# Patient Record
Sex: MALE | Race: WHITE | NOT HISPANIC OR LATINO | URBAN - METROPOLITAN AREA
[De-identification: names, ages, dates, MRNs, and addresses within clinical notes are randomized per-mention and may not be internally consistent; named-entity substitution may affect disease eponyms.]

---

## 2018-03-27 ENCOUNTER — OFFICE VISIT CONVERTED (OUTPATIENT)
Dept: OTHER | Facility: HOSPITAL | Age: 38
End: 2018-03-27
Attending: INTERNAL MEDICINE

## 2018-04-10 ENCOUNTER — OFFICE VISIT CONVERTED (OUTPATIENT)
Dept: OTHER | Facility: HOSPITAL | Age: 38
End: 2018-04-10
Attending: INTERNAL MEDICINE

## 2018-06-05 ENCOUNTER — OFFICE VISIT CONVERTED (OUTPATIENT)
Dept: OTHER | Facility: HOSPITAL | Age: 38
End: 2018-06-05
Attending: INTERNAL MEDICINE

## 2018-12-04 ENCOUNTER — OFFICE VISIT CONVERTED (OUTPATIENT)
Dept: OTHER | Facility: HOSPITAL | Age: 38
End: 2018-12-04
Attending: INTERNAL MEDICINE

## 2019-06-10 ENCOUNTER — HOSPITAL ENCOUNTER (OUTPATIENT)
Dept: OTHER | Facility: HOSPITAL | Age: 39
Discharge: HOME | End: 2019-06-10
Attending: INTERNAL MEDICINE

## 2019-06-10 ENCOUNTER — OFFICE VISIT CONVERTED (OUTPATIENT)
Dept: OTHER | Facility: HOSPITAL | Age: 39
End: 2019-06-10
Attending: INTERNAL MEDICINE

## 2019-06-10 LAB
BASOPHILS # BLD AUTO: 0.07 10*3/UL (ref 0–0.2)
BASOPHILS NFR BLD AUTO: 1 % (ref 0–3)
CONV ABS IMM GRAN: 0.09 10*3/UL (ref 0–0.2)
CONV IMMATURE GRAN: 1.3 % (ref 0–1.8)
DEPRECATED RDW RBC AUTO: 41.1 FL (ref 35.1–43.9)
EOSINOPHIL # BLD AUTO: 0.32 10*3/UL (ref 0–0.7)
EOSINOPHIL # BLD AUTO: 4.6 % (ref 0–7)
ERYTHROCYTE [DISTWIDTH] IN BLOOD BY AUTOMATED COUNT: 12.6 % (ref 11.6–14.4)
FERRITIN SERPL-MCNC: 425 NG/ML (ref 30–300)
HBA1C MFR BLD: 15.2 G/DL (ref 14–18)
HCT VFR BLD AUTO: 46.2 % (ref 42–52)
IRON SATN MFR SERPL: 21 % (ref 20–55)
IRON SERPL-MCNC: 81 UG/DL (ref 70–180)
LYMPHOCYTES # BLD AUTO: 1.93 10*3/UL (ref 1–5)
MCH RBC QN AUTO: 29.4 PG (ref 27–31)
MCHC RBC AUTO-ENTMCNC: 32.9 G/DL (ref 33–37)
MCV RBC AUTO: 89.4 FL (ref 80–96)
MONOCYTES # BLD AUTO: 0.47 10*3/UL (ref 0.2–1.2)
MONOCYTES NFR BLD AUTO: 6.8 % (ref 3–10)
NEUTROPHILS # BLD AUTO: 4.05 10*3/UL (ref 2–8)
NEUTROPHILS NFR BLD AUTO: 58.5 % (ref 30–85)
NRBC CBCN: 0 % (ref 0–0.7)
PLATELET # BLD AUTO: 374 10*3/UL (ref 130–400)
PMV BLD AUTO: 10.4 FL (ref 9.4–12.4)
RBC # BLD AUTO: 5.17 10*6/UL (ref 4.7–6.1)
RETICS # AUTO: 0.09 10*6/UL (ref 0.03–0.1)
RETICS/RBC NFR AUTO: 1.82 % (ref 0.51–1.81)
TIBC SERPL-MCNC: 379 UG/DL (ref 245–450)
TRANSFERRIN SERPL-MCNC: 265 MG/DL (ref 215–365)
VARIANT LYMPHS NFR BLD MANUAL: 27.8 % (ref 20–45)
WBC # BLD AUTO: 6.93 10*3/UL (ref 4.8–10.8)

## 2021-05-28 VITALS
BODY MASS INDEX: 32.57 KG/M2 | WEIGHT: 245.12 LBS | BODY MASS INDEX: 34.46 KG/M2 | OXYGEN SATURATION: 95 % | RESPIRATION RATE: 16 BRPM | SYSTOLIC BLOOD PRESSURE: 127 MMHG | RESPIRATION RATE: 21 BRPM | TEMPERATURE: 98 F | HEART RATE: 75 BPM | HEIGHT: 72 IN | DIASTOLIC BLOOD PRESSURE: 80 MMHG | WEIGHT: 242.25 LBS | HEIGHT: 72 IN | HEART RATE: 68 BPM | DIASTOLIC BLOOD PRESSURE: 73 MMHG | BODY MASS INDEX: 32.81 KG/M2 | OXYGEN SATURATION: 93 % | OXYGEN SATURATION: 94 % | DIASTOLIC BLOOD PRESSURE: 69 MMHG | RESPIRATION RATE: 16 BRPM | BODY MASS INDEX: 32.98 KG/M2 | OXYGEN SATURATION: 95 % | DIASTOLIC BLOOD PRESSURE: 77 MMHG | RESPIRATION RATE: 16 BRPM | TEMPERATURE: 98.4 F | RESPIRATION RATE: 18 BRPM | WEIGHT: 240.5 LBS | HEART RATE: 79 BPM | SYSTOLIC BLOOD PRESSURE: 136 MMHG | SYSTOLIC BLOOD PRESSURE: 125 MMHG | SYSTOLIC BLOOD PRESSURE: 130 MMHG | OXYGEN SATURATION: 97 % | WEIGHT: 254.4 LBS | SYSTOLIC BLOOD PRESSURE: 133 MMHG | TEMPERATURE: 98.3 F | TEMPERATURE: 98.1 F | HEIGHT: 72 IN | HEIGHT: 72 IN | HEIGHT: 72 IN | DIASTOLIC BLOOD PRESSURE: 76 MMHG | HEART RATE: 82 BPM | BODY MASS INDEX: 33.2 KG/M2 | TEMPERATURE: 98.3 F | HEART RATE: 85 BPM | WEIGHT: 243.5 LBS
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
## 2021-05-28 NOTE — PROGRESS NOTES
Patient: CIRO NASH     Acct: [id number]     Report: #[id number]  UNIT #: [id number]     : 1980    Encounter Date:2018  PRIMARY CARE:   ***Signed***  --------------------------------------------------------------------------------------------------------------------  Progress Note      DATE: 18      Primary Care Provider:  AGNES CHAN      Referring Provider:  AGNES CHAN      Chief Complaint      Thrombocytosis, SHANNON Follow-up            Subjective      37-year-old white male was referred for thrombocytosis which on workup for     secondary to iron deficiency anemia.  Patient has chronic hematuria probably     from nephrolithiasis and this is deemed to be this etiology of his SHANNON.      Patient had been started on iron and vitamin C and claimed that he had much     more energy that he can play with his daughter.            Past Med/Surg History            Past Med/Surg History:   No Hypertension             No Diabetes Mellitus             No Heart Disease             No Blood Clots             No Cancer             No Lung Disease             No Kidney Disease (stones)             Other (nephrolithiasis status post lithotripsy)            Social History      Social History:  No Tobacco Use, Alcohol Use ( a beer a day), No Recreational     Drug use, No Other            Allergies      Coded Allergies:             *No Known Allergies (Verified  Allergy, Mild, 3/27/18)            Medications      Medications    Last Reconciled on 18 18:43 by PASTOR ACHARYA MD      Ascorbic Acid (Vitamin C*) 500 Mg Tablet      500 MG PO BID for 90 Days, #180 TAB 3 Refills         Prov: Bertram Acharyaazon         4/10/18       Ferrous Sulfate (Ferrous Sulfate*) 325 Mg Tablet      325 MG PO TID for 90 Days, #270 TAB 3 Refills         Prov: Ve[place]         4/10/18      Current Medications      Current Medications Reviewed 18            Pain Assessment      Pain Intensity:  0      Description:  None             Review of Systems      General:  No Fatigue Scale:, No Pain Scale:      HEENT:  No Dysphagia, No Hearing Changes, No Visual Changes      Respiratory:  No Cough, No Shortness of Air      Cardiovascular:  No Chest Pain, No Palpitations      Gastrointestinal:  No Nausea, No Vomiting, No Constipation, No Diarrhea,     Appetite Good      Genitourinary:  No Nocturia, No Dysuria      Musculoskeletal:  No Aches, No Pains      Endocrine:  No Heat Intolerance, No Fatigue      Hematologic:  No Bleeding, No Bruising      Allergic/Immunologic:  No Hives, No Nasal drip      Psychological:  No Anxiety, No Depression      Neurological:  No Headaches, No Dizziness      Skin:  No Rash, No Edema            Vitals      Height 5 ft 11.5 in / 181.61 cm      Weight 242 lbs 4 oz / 109.405862 kg      BSA 2.39 m2      BMI 33.3 kg/m2      Temperature 98.4 F / 36.89 C      Pulse 68      Respirations 18      Blood Pressure 130/76      Pulse Oximetry 95%            Exam      Constitutional:  No acute distress, Conversant, Pleasant      Eyes:  Anicteric sclerae, Palpebral Conjunctivae (pink), IMTIAZ      HENT:  Oropharynx clear, No Erythema, Buccal mucosae (pink)      Neck:  Supple, Full Range of Motion      Cardiovascular:  RRR, No Murmurs, Normal PMI, No Peripheral Edema      Lungs:  Clear to Ausculation, Normal Respiratory Effort      Abdomen:  Soft, NABS, No Masses, No Tenderness      Chest:  Other (symmetrical and equal)      Lymphatic:  No Neck      Extremities:  No digital cyanosis, Normal gait      Neurological:  Cranial Nerve II-XII, No Focal Sensory deficits      Psychological:  Appropriate affect, Appropriate mood, Intact judgement, Alert      Skin:  Normal temperature, Other (no dermatosis)            Lab Results      Laboratory Tests      3/27/18 15:20            6/5/18 16:56            Laboratory Tests            Test        2/15/18      14:36 2/15/18      16:50 3/27/18      15:20 3/29/18      21:57             Troponin T         <0.01 ng/mL      (0.00-0.10)                      Lipase 23 U/L (5-51)                 Urine Collection Type  Unknown NA                Urine Color  YELLOW NA                Urine Appearance  CLEAR NA                Urine pH                6.0 (pH)      (5.0-8.0)                      Urine Specific Gravity                1.026 NA      (1.005-1.030)                      Urine Protein                NEGATIVE mg/dL      (NEGATIVE)                      Urine Glucose (UA)                NEGATIVE mg/dL      (NEGATIVE)                      Urine Ketones                NEGATIVE mg/dL      (NEGATIVE)                      Urine Occult Blood                TRACE NA      (NEGATIVE)                      Urine Nitrite                NEGATIVE NA      (NEGATIVE)                      Urine Bilirubin                NEGATIVE NA      (NEGATIVE)                      Urine Urobilinogen                0.2      {EhrlichU}/dL                      Urine Leukocyte Esterase                NEGATIVE NA      (NEGATIVE)                      Urine RBC                SMALL(6-10)      /(HPF)                      Urine WBC                NONE SEEN      /(HPF)                      Urine Bacteria  NEGATIVE NA                Urine Hyaline Casts  6-10 NA                Absolute Reticulocyte Count   93.10 10*3/uL               Percent Reticulocyte Count   1.76 %               Sodium Level                145 mmol/L      (135-147)              Potassium Level                3.7 mmol/L      (3.5-5.3)              Chloride Level                104 mmol/L      ()              Carbon Dioxide Level                28 mmol/L      (22-32)              Anion Gap                17 mmol/L      (8-19)              Blood Urea Nitrogen                13 mg/dL      (5-25)              Creatinine                0.89 mg/dL      (0.70-1.20)              Glomerular Filtration Rate      Calc                 >60      mL/min/{1.73_m2}              BUN/Creatinine Ratio                 15 {ratio}      (6-20)              Glucose Level                89 mg/dL      (70-99)              Serum Osmolality   300 (273-304)               Calcium Level                9.8 mg/dL      (8.7-10.4)              Total Bilirubin                0.77 mg/dL      (0.20-1.30)              Aspartate Amino Transf      (AST/SGOT)                 24 U/L (15-50)                      Alanine Aminotransferase      (ALT/SGPT)                 45 U/L (10-40)                      Alkaline Phosphatase                83 U/L      ()              Total Protein                7.3 g/dL      (6.3-8.2)              Albumin                4.9 g/dL      (3.5-5.0)              Globulin                2.4 g/dL      (2.0-3.5)              Albumin/Globulin Ratio                2.0 {ratio}      (1.4-2.6)              Anti-Nuclear Antibody Ref Lab                Negative NA      (Negative)              Anti-Nuclear Antibody Comment   Comment NA               Lab Scanned Report                LAB FINAL      CUMULATIVES -             Test        6/5/18      16:56                      White Blood Count        7.38 10*3/uL      (4.80-10.80)                      Red Blood Count        5.29 10*6/uL      (4.70-6.10)                      Hemoglobin        16.00 g/dL      (14.00-18.00)                      Hematocrit        46.4 %      (42.0-52.0)                      Mean Corpuscular Volume        87.7 fL      (80.0-96.0)                      Mean Corpuscular Hemoglobin        30.3 pg      (27.0-31.0)                      Mean Corpuscular Hemoglobin      Concent 34.6 %      (33.0-37.0)                      Red Cell Distribution Width        11.8 %      (11.5-14.5)                      Platelet Count        357.00 10*3/uL      (130..00)                      Mean Platelet Volume        7.2 fL      (7.4-10.4)                      Granulocytes (%)        47.1 %      (30.0-85.0)                      Lymphocytes (%) (Auto)        36.70 %       (20.00-45.00)                      Monocytes (%) (Auto)        7.49 %      (3.00-10.00)                      Eosinophils (%) (Auto)        7.98 %      (0.00-7.00)                      Basophils (%) (Auto)        0.77 %      (0.00-3.00)                      Granulocytes #        3.48 10*3/uL      (2.00-8.00)                      Lymphocytes # (Auto)        2.71 10*3/uL      (1.00-5.00)                      Monocytes # (Auto)        0.55 10*3/uL      (0.20-1.20)                      Eosinophils # (Auto)        0.59 10*3/uL      (0.00-0.70)                      Basophils # (Auto)        0.06 10*3/uL      (0.00-0.20)                      Nucleated Red Blood Cells %        0.00 %      (0.00-0.01)                      Erythrocyte Sedimentation Rate 1 mm/h (0-20)                 Iron Level        106 ug/dL      ()                      Total Iron Binding Capacity        360 ug/dL      (245-450)                      Percent Iron Saturation 29 % (20-55)                 Transferrin        252.00 mg/dL      (215..00)                      Ferritin        419 ng/mL      ()                            Impression/Problem List      Thrombocytosis - last platelet count is normaL      Low iron saturation resolved with iron replacement      Chronic hematuria      History of nephrolithiasis      Notes      New Diagnostics      * CBC, Stat       Dx: SHANNON (iron deficiency anemia) - D50.9      * Iron Profile, Stat       Dx: SHANNON (iron deficiency anemia) - D50.9      * Reticulocyte Count, Stat       Dx: SHANNON (iron deficiency anemia) - D50.9      * Ferritin Level, Stat       Dx: SHANNON (iron deficiency anemia) - D50.9            Plan      Ferrous sulfate 3 times a day      Vitamin C 500 mg twice a day      6 months repeat CBC, iron profile, ferritin level and reticulocyte count            Patient Education:        Iron-Deficiency Anemia            PREVENTION      Hx Influenza Vaccination:  No      Influenza Vaccine Declined:   No      2 or More Falls Past Year?:  No      Fall Past Year with Injury?:  No      Hx Pneumococcal Vaccination:  Yes      Encouraged to follow-up with:  PCP regarding preventative exams.      Chart initiated by      BABAK Herrera RN                 Disclaimer: Converted document may not contain table formatting or lab diagrams. Please see [employer] for the authenticated document.

## 2021-05-28 NOTE — PROGRESS NOTES
Patient: CIRO NASH     Acct: [id number]     Report: #[id number]  UNIT #: [id number]     : 1980    Encounter Date:04/10/2018  PRIMARY CARE:   ***Signed***  --------------------------------------------------------------------------------------------------------------------  Progress Note      DATE: 4/10/18      Primary Care Provider:  AGENS CHAN      Referring Provider:  AGNES CHAN      Chief Complaint      Follow up Thrombocytosis            Subjective      37-year-old white male has been referred for to macrocytosis.  Patient is here     for results of his workup.            Past Med/Surg History            Past Med/Surg History:   No Hypertension             No Diabetes Mellitus             No Heart Disease             No Blood Clots             No Cancer             No Lung Disease             No Kidney Disease (stones)             Other (nephrolithiasis status post lithotripsy)            Social History      Social History:  No Tobacco Use, Alcohol Use ( a beer a day), No Recreational     Drug use, No Other            Allergies      Coded Allergies:             *No Known Allergies (Verified  Allergy, Mild, 3/27/18)            Medications      Current Medications      Current Medications Reviewed 4/10/18            Pain Assessment      Pain Intensity:  2 (back and stomach)      Description:  Aching, None      Duration:  Intermittent            Review of Systems      General:  No Anxiety, Fatigue Scale: (5), Pain Scale: (2), No Fever, No Other      HEENT:  No Dysphagia, No Hearing Changes, No Rhinorrhea, No Tinnitus, No Visual     Changes, No Nasal Congestion, No Epistaxis, No Other      Respiratory:  No Cough, Shortness of Air, No Sputum Changes, No Wheezing, No     Hemoptysis, No Congestion, No Other      Cardiovascular:  No Chest Pain, No Pedal Edema, No Orthopnea, No Palpitations,     No Chest Pressure, No Dizziness, No Other      Gastrointestinal:  No Nausea, No Vomiting, No Dysphagia,  No Constipation, No     Diarrhea, No Appetite Good, Appetite Fair, No Appetite Poor, No Early Satiety,     No Other      Genitourinary:  No Nocturia, No Dysuria, No Other      Musculoskeletal:  No Joint Effusions, Joint Tenderness, Joint Stiffness, No     Myalgias, Aches, Pains, No Other      Endocrine:  No Heat Intolerance, No Cold Intolerance, No Fatigue, No Blood     Sugar Control, No Other      Hematologic:  No Bleeding, No Bruising, No Swollen Glands, No Other      Allergic/Immunologic:  No Hives, No Throat closing off, No Nasal drip, No Itchy     eyes, No Hay fever, No Other      Psychological:  No Anxiety, No Depression, No Other      Neurological:  No Headaches, No Dizziness, Weakness, No Numbness, No Other      Skin:  No Rash, No Open Wounds, No Edema, No Other            Vitals      Height 5 ft 11.5 in / 181.61 cm      Weight 243 lbs 8 oz / 110.281115 kg      BSA 2.40 m2      BMI 33.5 kg/m2      Temperature 98.1 F / 36.72 C - Oral      Pulse 79      Respirations 16      Blood Pressure 127/77 Sitting, Left Arm      Pulse Oximetry 97%, room air            Exam      Constitutional:  No acute distress, Conversant, Pleasant      Eyes:  Anicteric sclerae, Palpebral Conjunctivae (pink), IMTIAZ      Cardiovascular:  RRR, No Murmurs, Normal PMI, No Peripheral Edema      Lungs:  Clear to Ausculation, Normal Respiratory Effort      Chest:  Other (symmetrical and equal)      Extremities:  Normal gait      Neurological:  Cranial Nerve II-XII (Intact), No Focal Sensory deficits      Psychological:  Appropriate affect, Intact judgement, Alert      Skin:  Other            Lab Results      Laboratory Tests      3/27/18 15:20            Laboratory Tests            Test        2/15/18      14:36 2/15/18      16:50 3/27/18      15:20 3/29/18      21:57             Troponin T        <0.01 ng/mL      (0.00-0.10)                      Lipase 23 U/L (5-51)                 Urine Collection Type  Unknown NA                Urine Color   YELLOW NA                Urine Appearance  CLEAR NA                Urine pH                6.0 (pH)      (5.0-8.0)                      Urine Specific Gravity                1.026 NA      (1.005-1.030)                      Urine Protein                NEGATIVE mg/dL      (NEGATIVE)                      Urine Glucose (UA)                NEGATIVE mg/dL      (NEGATIVE)                      Urine Ketones                NEGATIVE mg/dL      (NEGATIVE)                      Urine Occult Blood                TRACE NA      (NEGATIVE)                      Urine Nitrite                NEGATIVE NA      (NEGATIVE)                      Urine Bilirubin                NEGATIVE NA      (NEGATIVE)                      Urine Urobilinogen                0.2      {EhrlichU}/dL                      Urine Leukocyte Esterase                NEGATIVE NA      (NEGATIVE)                      Urine RBC                SMALL(6-10)      /(HPF)                      Urine WBC                NONE SEEN      /(HPF)                      Urine Bacteria  NEGATIVE NA                Urine Hyaline Casts  6-10 NA                White Blood Count                7.21 10*3/uL      (4.80-10.80)              Red Blood Count                5.31 10*6/uL      (4.70-6.10)              Hemoglobin                15.60 g/dL      (14.00-18.00)              Hematocrit                46.8 %      (42.0-52.0)              Mean Corpuscular Volume                88.2 fL      (80.0-96.0)              Mean Corpuscular Hemoglobin                29.4 pg      (27.0-31.0)              Mean Corpuscular Hemoglobin      Concent                 33.3 %      (33.0-37.0)              Red Cell Distribution Width                11.9 %      (11.5-14.5)              Platelet Count                341.00 10*3/uL      (130..00)              Mean Platelet Volume                7.6 fL      (7.4-10.4)              Granulocytes (%)                49.4 %      (30.0-85.0)              Lymphocytes  (%) (Auto)                33.60 %      (20.00-45.00)              Monocytes (%) (Auto)                7.32 %      (3.00-10.00)              Eosinophils (%) (Auto)                8.58 %      (0.00-7.00)              Basophils (%) (Auto)                1.11 %      (0.00-3.00)              Granulocytes #                3.57 10*3/uL      (2.00-8.00)              Lymphocytes # (Auto)                2.42 10*3/uL      (1.00-5.00)              Monocytes # (Auto)                0.53 10*3/uL      (0.20-1.20)              Eosinophils # (Auto)                0.62 10*3/uL      (0.00-0.70)              Basophils # (Auto)                0.08 10*3/uL      (0.00-0.20)              Nucleated Red Blood Cells %                0.00 %      (0.00-0.01)              Erythrocyte Sedimentation Rate   2 mm/h (0-20)               Absolute Reticulocyte Count   93.10 10*3/uL               Percent Reticulocyte Count   1.76 %               Sodium Level                145 mmol/L      (135-147)              Potassium Level                3.7 mmol/L      (3.5-5.3)              Chloride Level                104 mmol/L      ()              Carbon Dioxide Level                28 mmol/L      (22-32)              Anion Gap                17 mmol/L      (8-19)              Blood Urea Nitrogen                13 mg/dL      (5-25)              Creatinine                0.89 mg/dL      (0.70-1.20)              Glomerular Filtration Rate      Calc                 >60      mL/min/{1.73_m2}              BUN/Creatinine Ratio                15 {ratio}      (6-20)              Glucose Level                89 mg/dL      (70-99)              Serum Osmolality   300 (273-304)               Calcium Level                9.8 mg/dL      (8.7-10.4)              Iron Level                68 ug/dL      ()              Total Iron Binding Capacity                408 ug/dL      (245-450)              Percent Iron Saturation   17 % (20-55)               Transferrin                 285.00 mg/dL      (215..00)              Ferritin                375 ng/mL      ()              Total Bilirubin                0.77 mg/dL      (0.20-1.30)              Aspartate Amino Transf      (AST/SGOT)                 24 U/L (15-50)                      Alanine Aminotransferase      (ALT/SGPT)                 45 U/L (10-40)                      Alkaline Phosphatase                83 U/L      ()              Total Protein                7.3 g/dL      (6.3-8.2)              Albumin                4.9 g/dL      (3.5-5.0)              Globulin                2.4 g/dL      (2.0-3.5)              Albumin/Globulin Ratio                2.0 {ratio}      (1.4-2.6)              Anti-Nuclear Antibody Ref Lab                Negative NA      (Negative)              Anti-Nuclear Antibody Comment   Comment NA               Lab Scanned Report                LAB FINAL      CUMULATIVES -            Impression/Problem List      Thrombocytosis - last platelet count is normaL      Low iron saturation      Chronic hematuria      History of nephrolithiasis      Notes      New Medications      * FERROUS SULFATE (Ferrous Sulfate*) 325 MG TABLET: 325 MG PO TID 90 Days #270      * ASCORBIC ACID (Vitamin C*) 500 MG TABLET: 500 MG PO BID 90 Days #180            Plan      Ferrous sulfate 3 times a day      Vitamin C 500 mg twice a day      8 weeks repeat CBC, iron profile, ferritin level and reticulocyte count            Patient Education:        Platelet Count            PREVENTION      Hx Influenza Vaccination:  No      2 or More Falls Past Year?:  No      Fall Past Year with Injury?:  No      Hx Pneumococcal Vaccination:  Yes      Encouraged to follow-up with:  PCP regarding preventative exams.      Chart initiated by      Jacque Anton MA                 Disclaimer: Converted document may not contain table formatting or lab diagrams. Please see [employer] for the authenticated  document.

## 2021-05-28 NOTE — PROGRESS NOTES
Patient: CIRO NASH     Acct: [id number]     Report: #[id number]  UNIT #: [id number]     : 1980    Encounter Date:06/10/2019  PRIMARY CARE:   ***Signed***  --------------------------------------------------------------------------------------------------------------------  Progress Note      DATE: 6/10/19      Primary Care Provider:  AGNES CHAN      Referring Provider:  AGNES CHAN      Chief Complaint      f/u anemia/thrombocytosis            Subjective      38-year-old white male was referred initially because of iron deficiency anemia     and possible thrombocytosis.  With iron replacement patient's normocytosis had     resolved.  Last December he CBC was normal and his iron profile was normal and     the patient's iron was discontinued.            Singh is here to see if his CBC and iron profile stayed normal of iron     replacement.            Past Med/Surg History            Past Med/Surg History:   No Hypertension             No Diabetes Mellitus             No Heart Disease             No Blood Clots             No Cancer             No Lung Disease             No Kidney Disease (stones)             Other (nephrolithiasis status post lithotripsy)            Social History      Social History:  No Tobacco Use; Alcohol Use ( a beer a day); No Recreational     Drug use, No Other            Allergies      Coded Allergies:             *No Known Allergies (Verified  Allergy, Mild, 6/10/19)            Medications      Medications    Last Reconciled on 18 18:43 by PASTOR ROLDAN MD      No Medication Information Entered            Current Medications      Current Medications Reviewed 6/10/19            Pain Assessment      Pain Intensity:  0      Description:  None            Review of Systems      General:  No Anxiety; Fatigue Scale: (9), Pain Scale: (0); No Fever, No Other      HEENT:  No Dysphagia, No Hearing Changes, No Rhinorrhea, No Tinnitus, No Visual     Changes, No Nasal Congestion, No  Epistaxis, No Other      Respiratory:  No Cough, No Shortness of Air, No Sputum Changes, No Wheezing, No     Hemoptysis, No Congestion, No Other      Cardiovascular:  No Chest Pain, No Pedal Edema, No Orthopnea, No Palpitations,     No Chest Pressure, No Dizziness, No Other      Gastrointestinal:  No Nausea, No Vomiting, No Dysphagia, No Constipation, No     Diarrhea, No Appetite Good, No Appetite Fair, No Appetite Poor, No Early     Satiety, No Other      Genitourinary:  No Nocturia, No Dysuria, No Other      Musculoskeletal:  No Joint Effusions, No Joint Tenderness, No Joint Stiffness,     No Myalgias, No Aches, No Pains, No Other      Endocrine:  No Heat Intolerance, No Cold Intolerance, No Fatigue, No Blood Sugar    Control, No Other      Hematologic:  No Bleeding, No Bruising, No Swollen Glands, No Other      Allergic/Immunologic:  No Hives, No Throat closing off, No Nasal drip, No Itchy     eyes, No Hay fever, No Other      Psychological:  No Anxiety, No Depression, No Other      Neurological:  No Headaches, No Dizziness, No Weakness, No Numbness, No Other      Skin:  No Rash, No Open Wounds, No Edema, No Other            Vitals      Height 6 ft 0 in / 182.88 cm      Weight 245 lbs 2 oz / 111.614148 kg      BSA 2.32 m2      BMI 33.2 kg/m2      Temperature 98.0 F / 36.67 C - Oral      Pulse 75      Respirations 16      Blood Pressure 136/80 Sitting, Right Arm      Pulse Oximetry 95%, room air            Exam      Constitutional:  No acute distress, Conversant, Pleasant      Eyes:  Anicteric sclerae, Palpebral Conjunctivae (Pink), IMTIAZ      HENT:  Oropharynx clear; No Erythema; Buccal mucosae (Pink)      Neck:  Supple, Full Range of Motion      Cardiovascular:  RRR; No Murmurs; Normal PMI; No Peripheral Edema      Lungs:  Clear to Ausculation, Normal Respiratory Effort      Abdomen:  Soft, NABS; No Tenderness      Chest:  Other (Symmetrical)      Extremities:  No digital cyanosis, No digital ischemia, Normal  gait, Other (No     deformity)      Neurological:  Cranial Nerve II-XII (Intact); No Focal Sensory deficits      Psychological:  Appropriate affect, Appropriate mood, Intact judgement, Alert      Skin:  Other (No dermatoses)            Lab Results      CBC iron profile drawn today            Impression/Problem List      Thrombocytosis -resolved      Iron deficiency resolved      History of nephrolithiasis      Notes      Discontinued Medications      * FERROUS SULFATE (Ferrous Sulfate*) 325 MG TABLET: 325 MG PO TID 90 Days #270      * ASCORBIC ACID (Vitamin C*) 500 MG TABLET: 500 MG PO BID 90 Days #180      New Diagnostics      * CBC With Auto Diff, Stat         Dx: Thrombocythemia - D47.3      * Iron Profile, Stat         Dx: Thrombocythemia - D47.3      * Reticulocyte Count, Stat         Dx: Thrombocythemia - D47.3      * Ferritin Level, Stat         Dx: Thrombocythemia - D47.3            Plan      CBC, iron profile, ferritin  done today.      Patient will be called back if his studies are still abnormal.      Otherwise patient is discharged from the clinic to be followed as needed.            PREVENTION      Hx Influenza Vaccination:  Yes      Influenza Vaccine Declined:  No      2 or More Falls Past Year?:  No      Fall Past Year with Injury?:  No      Hx Pneumococcal Vaccination:  Yes      Encouraged to follow-up with:  PCP regarding preventative exams.      Chart initiated by      cuauhtemoc herzog ma                 Disclaimer: Converted document may not contain table formatting or lab diagrams. Please see [employer] [facility] for the authenticated document.

## 2021-05-28 NOTE — PROGRESS NOTES
Patient: CIRO NASH     Acct: [id number]     Report: #[id number]  UNIT #: [id number]     : 1980    Encounter Date:2018  PRIMARY CARE:   ***Signed***  --------------------------------------------------------------------------------------------------------------------  Progress Note      DATE: 18      Primary Care Provider:  AGNES CHAN      Referring Provider:  AGNES CHAN      Chief Complaint      FU SHANNON/ thrombocytosis            Subjective      37-year-old white male has history of iron deficiency anemia with     thrombocytosis.  Patient was given iron supplements with vitamin C and initially    felt much better.  His thrombocytosis resolved.      He takes his iron when he remembers it.      Patient claims that he still suffers from some fatigue.            Past Med/Surg History            Past Med/Surg History:   No Hypertension             No Diabetes Mellitus             No Heart Disease             No Blood Clots             No Cancer             No Lung Disease             No Kidney Disease (stones)             Other (nephrolithiasis status post lithotripsy)            Social History      Social History:  No Tobacco Use; Alcohol Use ( a beer a day); No Recreational     Drug use, No Other            Allergies      Coded Allergies:             *No Known Allergies (Verified  Allergy, Mild, 3/27/18)            Medications      Medications    Last Reconciled on 18 18:43 by PASTOR ROLDAN MD      Ascorbic Acid (Vitamin C*) 500 Mg Tablet      500 MG PO BID for 90 Days, #180 TAB 3 Refills         Prov: Veza,Rodney         4/10/18       Ferrous Sulfate (Ferrous Sulfate*) 325 Mg Tablet      325 MG PO TID for 90 Days, #270 TAB 3 Refills         Prov: Veza,Rodney         4/10/18      Current Medications      Current Medications Reviewed 18      SANTANA Saldivar MA            Pain Assessment      Pain Intensity:  0      Description:  None            Review of Systems      General:  No  Anxiety; Fatigue Scale: (6), Pain Scale: (0)      HEENT:  No Dysphagia, No Hearing Changes      Respiratory:  No Cough, No Shortness of Air      Cardiovascular:  No Chest Pain, No Pedal Edema      Gastrointestinal:  No Nausea, No Vomiting; Appetite Fair (fair)      Genitourinary:  No Nocturia, No Dysuria      Musculoskeletal:  No Joint Effusions, No Joint Tenderness      Endocrine:  No Heat Intolerance, No Cold Intolerance      Hematologic:  No Bleeding, No Bruising      Allergic/Immunologic:  No Hives, No Throat closing off      Psychological:  No Anxiety, No Depression      Neurological:  No Headaches, No Dizziness      Skin:  No Rash, No Open Wounds      Review of Systems      K Saldivar MA            Vitals      Height 6 ft 0 in / 182.88 cm      Weight 254 lbs 6.4 oz / 115.891167 kg      BSA 2.36 m2      BMI 34.5 kg/m2      Temperature 98.3 F / 36.83 C      Pulse 82      Respirations 21      Blood Pressure 125/73      Pulse Oximetry 94%            Exam      Constitutional:  No acute distress, Conversant, Pleasant      Eyes:  Anicteric sclerae, Palpebral Conjunctivae (Pink), IMTIAZ      HENT:  Oropharynx clear; No Erythema; Buccal mucosae (Pink)      Neck:  Supple, Full Range of Motion      Cardiovascular:  RRR; No Murmurs; Normal PMI; No Peripheral Edema      Lungs:  Clear to Ausculation, Normal Respiratory Effort      Abdomen:  Soft, NABS; No Tenderness      Chest:  Other (Symmetric)      Lymphatic:  No Neck      Extremities:  No digital cyanosis, No digital ischemia, Normal gait, Other (No     deformity)      Neurological:  Cranial Nerve II-XII (Intact); No Focal Sensory deficits      Psychological:  Appropriate affect, Appropriate mood, Intact judgement, Alert      Skin:  Other (No dermatosis)            Lab Results      In June 5, 2018 patient's CBC was normal, iron profile was normal, and ferritin     was high at 419            Impression/Problem List      Thrombocytosis -resolved      Iron deficiency  resolved      History of nephrolithiasis            Plan      May stop iron and vitamin C      6 months repeat CBC, iron profile, ferritin level and reticulocyte count            Patient Education:        Iron-Deficiency Anemia            PREVENTION      Hx Influenza Vaccination:  No      Influenza Vaccine Declined:  No      2 or More Falls Past Year?:  No      Fall Past Year with Injury?:  No      Hx Pneumococcal Vaccination:  Yes      Encouraged to follow-up with:  PCP regarding preventative exams.                 Disclaimer: Converted document may not contain table formatting or lab diagrams. Please see [employer] for the authenticated document.